# Patient Record
(demographics unavailable — no encounter records)

---

## 2024-11-11 NOTE — PLAN
[FreeTextEntry1] : 25 y/o patient present for gyn problem visit Patient also states its possible she has a UTI for the past 6 month she states her urine smells and has a burning sensation when she urinates.  she had complained of this at the time of her annual and I referred to urologist for this and recurrent UTI but she never went as it seemed better until now advised to now rule out UTI and vaginitis due annual in [place]

## 2024-11-11 NOTE — PHYSICAL EXAM
[Chaperone Present] : A chaperone was present in the examining room during all aspects of the physical examination [[id number]] : A chaperone was present during the pelvic exam. [Appropriately responsive] : appropriately responsive [Alert] : alert [No Acute Distress] : no acute distress [No Lymphadenopathy] : no lymphadenopathy [Regular Rate Rhythm] : regular rate rhythm [No Murmurs] : no murmurs [Clear to Auscultation B/L] : clear to auscultation bilaterally [Soft] : soft [Non-tender] : non-tender [Non-distended] : non-distended [No Lesions] : no lesions [No Mass] : no mass [Oriented x3] : oriented x3 [Examination Of The Breasts] : a normal appearance [No Masses] : no breast masses were palpable [Labia Majora] : normal [Labia Minora] : normal [Normal] : normal [Uterine Adnexae] : normal [FreeTextEntry2] : laure

## 2024-11-11 NOTE — HISTORY OF PRESENT ILLNESS
[FreeTextEntry1] : 25 y/o patient present for gyn problem visit Patient also states its possible she has a UTI for the past 6 month she states her urine smells and has a burning sensation when she urinates.